# Patient Record
Sex: FEMALE | Race: WHITE | URBAN - METROPOLITAN AREA
[De-identification: names, ages, dates, MRNs, and addresses within clinical notes are randomized per-mention and may not be internally consistent; named-entity substitution may affect disease eponyms.]

---

## 2018-02-28 ENCOUNTER — TRANSFERRED RECORDS (OUTPATIENT)
Dept: HEALTH INFORMATION MANAGEMENT | Facility: CLINIC | Age: 28
End: 2018-02-28

## 2018-04-02 LAB
HBV SURFACE AG SERPL QL IA: NON REACTIVE
HIV 1+2 AB+HIV1 P24 AG SERPL QL IA: NON REACTIVE
RUBELLA ABY IGG: NORMAL

## 2018-05-21 ENCOUNTER — TRANSFERRED RECORDS (OUTPATIENT)
Dept: HEALTH INFORMATION MANAGEMENT | Facility: CLINIC | Age: 28
End: 2018-05-21

## 2018-09-06 LAB — GROUP B STREP PCR: NEGATIVE

## 2018-09-30 ENCOUNTER — HOSPITAL ENCOUNTER (OUTPATIENT)
Facility: CLINIC | Age: 28
Discharge: HOME | End: 2018-09-30
Attending: OBSTETRICS & GYNECOLOGY | Admitting: OBSTETRICS & GYNECOLOGY
Payer: MEDICAID

## 2018-09-30 VITALS
SYSTOLIC BLOOD PRESSURE: 120 MMHG | BODY MASS INDEX: 33.66 KG/M2 | DIASTOLIC BLOOD PRESSURE: 76 MMHG | WEIGHT: 190 LBS | RESPIRATION RATE: 20 BRPM | HEIGHT: 63 IN | TEMPERATURE: 98.1 F

## 2018-09-30 PROBLEM — Z36.89 ENCOUNTER FOR TRIAGE IN PREGNANT PATIENT: Status: ACTIVE | Noted: 2018-09-30

## 2018-09-30 LAB — RUPTURE OF FETAL MEMBRANES BY ROM PLUS: NEGATIVE

## 2018-09-30 PROCEDURE — 84112 EVAL AMNIOTIC FLUID PROTEIN: CPT | Performed by: OBSTETRICS & GYNECOLOGY

## 2018-09-30 PROCEDURE — G0463 HOSPITAL OUTPT CLINIC VISIT: HCPCS

## 2018-09-30 NOTE — IP AVS SNAPSHOT
MRN:[id number]                      After Visit Summary   9/30/2018    Clary Thibodeaux    MRN: [id number]           Thank you!     Thank you for choosing [facility] for your care. Our goal is always to provide you with excellent care. Hearing back from our patients is one way we can continue to improve our services. Please take a few minutes to complete the written survey that you may receive in the mail after you visit. If you would like to speak to someone directly about your visit please contact Patient Relations at [phone]. Thank you!          Patient Information     Date Of Birth          1990        About your hospital stay     You were admitted on:  September 30, 2018 You last received care in the:  [facility] Labor and Delivery    You were discharged on:  September 30, 2018       Who to Call     For medical emergencies, please call 911.  For non-urgent questions about your medical care, please call your primary care provider or clinic, None          Attending Provider     Provider Steffi Livingston,  OB/Gyn       Primary Care Provider    None Specified      Further instructions from your care team       Discharge Instruction for Undelivered Patients      You were seen for: Membrane Assessment  We Consulted: Dr. Steffi Avitia  You had (Test or Medicine):ROM Plus    Diet:   Drink 8 to 12 glasses of liquids (milk, juice, water) every day.  You may eat meals and snacks.     Activity:  Count fetal kicks everyday (see handout)  Call your doctor or nurse midwife if your baby is moving less than usual.     Call your provider if you notice:  Swelling in your face or increased swelling in your hands or legs.  Headaches that are not relieved by Tylenol (acetaminophen).  Changes in your vision (blurring: seeing spots or stars.)  Nausea (sick to your stomach) and vomiting (throwing up).   Weight gain of 5 pounds or more per week.  Heartburn that  "doesn't go away.  Signs of bladder infection: pain when you urinate (use the toilet), need to go more often and more urgently.  The bag of toscano (rupture of membranes) breaks, or you notice leaking in your underwear.  Bright red blood in your underwear.  Abdominal (lower belly) or stomach pain.  For first baby: Contractions (tightening) less than 5 minutes apart for one hour or more.  Second (plus) baby: Contractions (tightening) less than 10 minutes apart and getting stronger.  *If less than 34 weeks: Contractions (tightenings) more than 6 times in one hour.  Increase or change in vaginal discharge (note the color and amount)      Follow-up:  As scheduled in the clinic          Pending Results     No orders found from 2018 to 10/1/2018.            Admission Information     Date & Time Provider Department Dept. Phone    2018 Steffi Avitia,  [facility] Labor and Delivery [phone]      Your Vitals Were     Blood Pressure Temperature Respirations Height Weight BMI (Body Mass Index)    120/76 98.1  F (36.7  C) (Oral) 20 1.6 m (5' 3\") 86.2 kg (190 lb) 33.66 kg/m2      MyChart Information     [employer] lets you send messages to your doctor, view your test results, renew your prescriptions, schedule appointments and more. To sign up, go to [email or web address] . Click on \"Log in\" on the left side of the screen, which will take you to the Welcome page. Then click on \"Sign up Now\" on the right side of the page.     You will be asked to enter the access code listed below, as well as some personal information. Please follow the directions to create your username and password.     Your access code is: [email or web address]  Expires: 2018  2:56 AM     Your access code will  in 90 days. If you need help or a new code, please call your [place] clinic or [phone].        Care EveryWhere ID     This is your Care EveryWhere ID. This could be used by other organizations to access your [place] " medical records  [id number]        Equal Access to Services     ANTONIA BURGOS : Yasmin Venegas, wabrian serrano, qafredrick coats, sami romo. So [facility] [phone].    ATENCIÓN: Si habla español, tiene a leary disposición servicios gratuitos de asistencia lingüística. Llame al [phone].    We comply with applicable federal civil rights laws and [place] laws. We do not discriminate on the basis of race, color, national origin, age, disability, sex, sexual orientation, or gender identity.               Review of your medicines      UNREVIEWED medicines. Ask your doctor about these medicines        Dose / Directions    prenatal multivitamin plus iron 27-0.8 MG Tabs per tablet        Dose:  1 tablet   Take 1 tablet by mouth daily   Refills:  0                Protect others around you: Learn how to safely use, store and throw away your medicines at www.disposemymeds.org.             Medication List: This is a list of all your medications and when to take them. Check marks below indicate your daily home schedule. Keep this list as a reference.      Medications           Morning Afternoon Evening Bedtime As Needed    prenatal multivitamin plus iron 27-0.8 MG Tabs per tablet   Take 1 tablet by mouth daily

## 2018-09-30 NOTE — PLAN OF CARE
Data: [facility]: 2018 12:34 AM.  Reason for maternal/fetal assessment is uterine contractions. Patient reports amor every 6-8 minutes.  Patient is a .  Prenatal record reviewed. Pregnancy  has been complicated by has been uncomplicated.  Gestational Age Unknown. VSS. Fetal movement present. Patient denies vaginal bleeding, abdominal pain, pelvic pressure, nausea, vomiting, headache, visual disturbances, epigastric or URQ pain, significant edema. Support person is present.   Action: Verbal consent for EFM. Triage assessment completed. Bill of rights reviewed.  Response: Patient verbalized agreement with plan. Will contact Dr Steffi Avitia with update and further orders.

## 2018-09-30 NOTE — PLAN OF CARE
Data: Patient assessed in the [facility] for uterine contractions and leaking vaginal fluid.  Cervical exam posterior, 2/60/-2.  Membranes intact.  Contractions every 2-5 minutes.  Action:  Presumed adequate fetal oxygenation documented (see flow record). Discharge instructions reviewed.  Patient instructed to report change in fetal movement, vaginal leaking of fluid or bleeding, abdominal pain, or any concerns related to the pregnancy to her nurse/physician.    Response: Orders to discharge home per Steffi Avitia.  Patient verbalized understanding of education and verbalized agreement with plan. Discharged to home at 0257.

## 2018-09-30 NOTE — PROVIDER NOTIFICATION
09/30/18 0255   Provider Notification   Provider Name/Title Dr. Avitia    Method of Notification Phone   Request Evaluate - Remote   Notification Reason Membrane Status;Uterine Activity;SVE   Updated Dr. Avitia SVE 2/60/-2 with no change after 40 minutes of ambulating unit. Pt felt small leaking after ambulating, ROM + collected and resulted negative. Contractions every 2-5 minutes, pt resting and comfortable. Baby Category 1. Gave orders for discharge.

## 2018-09-30 NOTE — IP AVS SNAPSHOT
[facility] Labor and Delivery    [address]    [facility] [address]    Phone:  [phone]    Fax:  [phone]                                       After Visit Summary   9/30/2018    Clary Thibodeaux    MRN: [id number]           After Visit Summary Signature Page     I have received my discharge instructions, and my questions have been answered. I have discussed any challenges I see with this plan with the nurse or doctor.    ..........................................................................................................................................  Patient/Patient Representative Signature      ..........................................................................................................................................  Patient Representative Print Name and Relationship to Patient    ..................................................               ................................................  Date                                   Time    ..........................................................................................................................................  Reviewed by Signature/Title    ...................................................              ..............................................  Date                                               Time          22EPIC Rev 08/18

## 2018-09-30 NOTE — DISCHARGE INSTRUCTIONS
Discharge Instruction for Undelivered Patients      You were seen for: Membrane Assessment  We Consulted: Dr. Steffi Avitia  You had (Test or Medicine):ROM Plus    Diet:   Drink 8 to 12 glasses of liquids (milk, juice, water) every day.  You may eat meals and snacks.     Activity:  Count fetal kicks everyday (see handout)  Call your doctor or nurse midwife if your baby is moving less than usual.     Call your provider if you notice:  Swelling in your face or increased swelling in your hands or legs.  Headaches that are not relieved by Tylenol (acetaminophen).  Changes in your vision (blurring: seeing spots or stars.)  Nausea (sick to your stomach) and vomiting (throwing up).   Weight gain of 5 pounds or more per week.  Heartburn that doesn't go away.  Signs of bladder infection: pain when you urinate (use the toilet), need to go more often and more urgently.  The bag of toscano (rupture of membranes) breaks, or you notice leaking in your underwear.  Bright red blood in your underwear.  Abdominal (lower belly) or stomach pain.  For first baby: Contractions (tightening) less than 5 minutes apart for one hour or more.  Second (plus) baby: Contractions (tightening) less than 10 minutes apart and getting stronger.  *If less than 34 weeks: Contractions (tightenings) more than 6 times in one hour.  Increase or change in vaginal discharge (note the color and amount)      Follow-up:  As scheduled in the clinic

## 2018-10-01 ENCOUNTER — HOSPITAL ENCOUNTER (INPATIENT)
Facility: CLINIC | Age: 28
LOS: 1 days | Discharge: HOME | End: 2018-10-02
Attending: OBSTETRICS & GYNECOLOGY | Admitting: OBSTETRICS & GYNECOLOGY
Payer: COMMERCIAL

## 2018-10-01 ENCOUNTER — ANESTHESIA (OUTPATIENT)
Dept: OBGYN | Facility: CLINIC | Age: 28
End: 2018-10-01
Payer: COMMERCIAL

## 2018-10-01 ENCOUNTER — ANESTHESIA EVENT (OUTPATIENT)
Dept: OBGYN | Facility: CLINIC | Age: 28
End: 2018-10-01
Payer: COMMERCIAL

## 2018-10-01 LAB
ABO + RH BLD: NORMAL
ABO + RH BLD: NORMAL
HGB BLD-MCNC: 13.3 G/DL (ref 11.7–15.7)
SPECIMEN EXP DATE BLD: NORMAL
T PALLIDUM AB SER QL: NONREACTIVE

## 2018-10-01 PROCEDURE — 25000128 H RX IP 250 OP 636: Performed by: OBSTETRICS & GYNECOLOGY

## 2018-10-01 PROCEDURE — 25000128 H RX IP 250 OP 636: Performed by: ANESTHESIOLOGY

## 2018-10-01 PROCEDURE — 12000029 ZZH R&B OB INTERMEDIATE

## 2018-10-01 PROCEDURE — 00HU33Z INSERTION OF INFUSION DEVICE INTO SPINAL CANAL, PERCUTANEOUS APPROACH: ICD-10-PCS | Performed by: ANESTHESIOLOGY

## 2018-10-01 PROCEDURE — 86901 BLOOD TYPING SEROLOGIC RH(D): CPT | Performed by: OBSTETRICS & GYNECOLOGY

## 2018-10-01 PROCEDURE — 25000132 ZZH RX MED GY IP 250 OP 250 PS 637: Performed by: OBSTETRICS & GYNECOLOGY

## 2018-10-01 PROCEDURE — 25000125 ZZHC RX 250

## 2018-10-01 PROCEDURE — 37000011 ZZH ANESTHESIA WARD SERVICE

## 2018-10-01 PROCEDURE — 40000671 ZZH STATISTIC ANESTHESIA CASE

## 2018-10-01 PROCEDURE — 25000128 H RX IP 250 OP 636

## 2018-10-01 PROCEDURE — 10907ZC DRAINAGE OF AMNIOTIC FLUID, THERAPEUTIC FROM PRODUCTS OF CONCEPTION, VIA NATURAL OR ARTIFICIAL OPENING: ICD-10-PCS | Performed by: OBSTETRICS & GYNECOLOGY

## 2018-10-01 PROCEDURE — 86780 TREPONEMA PALLIDUM: CPT | Performed by: OBSTETRICS & GYNECOLOGY

## 2018-10-01 PROCEDURE — 27110038 ZZH RX 271

## 2018-10-01 PROCEDURE — 72200001 ZZH LABOR CARE VAGINAL DELIVERY SINGLE

## 2018-10-01 PROCEDURE — 85018 HEMOGLOBIN: CPT | Performed by: OBSTETRICS & GYNECOLOGY

## 2018-10-01 PROCEDURE — 3E0R3BZ INTRODUCTION OF ANESTHETIC AGENT INTO SPINAL CANAL, PERCUTANEOUS APPROACH: ICD-10-PCS | Performed by: ANESTHESIOLOGY

## 2018-10-01 PROCEDURE — 86900 BLOOD TYPING SEROLOGIC ABO: CPT | Performed by: OBSTETRICS & GYNECOLOGY

## 2018-10-01 RX ORDER — BUPIVACAINE HCL/0.9 % NACL/PF 0.125 %
PLASTIC BAG, INJECTION (ML) EPIDURAL
Status: COMPLETED
Start: 2018-10-01 | End: 2018-10-01

## 2018-10-01 RX ORDER — AMOXICILLIN 250 MG
2 CAPSULE ORAL 2 TIMES DAILY
Status: DISCONTINUED | OUTPATIENT
Start: 2018-10-01 | End: 2018-10-02 | Stop reason: HOSPADM

## 2018-10-01 RX ORDER — ACETAMINOPHEN 325 MG/1
650 TABLET ORAL EVERY 4 HOURS PRN
Status: DISCONTINUED | OUTPATIENT
Start: 2018-10-01 | End: 2018-10-01

## 2018-10-01 RX ORDER — OXYTOCIN/0.9 % SODIUM CHLORIDE 30/500 ML
100-340 PLASTIC BAG, INJECTION (ML) INTRAVENOUS CONTINUOUS PRN
Status: COMPLETED | OUTPATIENT
Start: 2018-10-01 | End: 2018-10-01

## 2018-10-01 RX ORDER — NALOXONE HYDROCHLORIDE 0.4 MG/ML
.1-.4 INJECTION, SOLUTION INTRAMUSCULAR; INTRAVENOUS; SUBCUTANEOUS
Status: DISCONTINUED | OUTPATIENT
Start: 2018-10-01 | End: 2018-10-01

## 2018-10-01 RX ORDER — ONDANSETRON 2 MG/ML
4 INJECTION INTRAMUSCULAR; INTRAVENOUS EVERY 6 HOURS PRN
Status: DISCONTINUED | OUTPATIENT
Start: 2018-10-01 | End: 2018-10-01

## 2018-10-01 RX ORDER — MISOPROSTOL 200 UG/1
800 TABLET ORAL
Status: DISCONTINUED | OUTPATIENT
Start: 2018-10-01 | End: 2018-10-02 | Stop reason: HOSPADM

## 2018-10-01 RX ORDER — BUPIVACAINE HCL/0.9 % NACL/PF 0.125 %
15 PLASTIC BAG, INJECTION (ML) EPIDURAL CONTINUOUS
Status: DISCONTINUED | OUTPATIENT
Start: 2018-10-01 | End: 2018-10-01

## 2018-10-01 RX ORDER — OXYTOCIN/0.9 % SODIUM CHLORIDE 30/500 ML
100 PLASTIC BAG, INJECTION (ML) INTRAVENOUS CONTINUOUS
Status: DISCONTINUED | OUTPATIENT
Start: 2018-10-01 | End: 2018-10-02 | Stop reason: HOSPADM

## 2018-10-01 RX ORDER — OXYCODONE AND ACETAMINOPHEN 5; 325 MG/1; MG/1
1 TABLET ORAL
Status: DISCONTINUED | OUTPATIENT
Start: 2018-10-01 | End: 2018-10-01

## 2018-10-01 RX ORDER — CARBOPROST TROMETHAMINE 250 UG/ML
250 INJECTION, SOLUTION INTRAMUSCULAR
Status: DISCONTINUED | OUTPATIENT
Start: 2018-10-01 | End: 2018-10-01

## 2018-10-01 RX ORDER — HYDROCORTISONE 2.5 %
CREAM (GRAM) TOPICAL 3 TIMES DAILY PRN
Status: DISCONTINUED | OUTPATIENT
Start: 2018-10-01 | End: 2018-10-02 | Stop reason: HOSPADM

## 2018-10-01 RX ORDER — FENTANYL CITRATE 50 UG/ML
50-100 INJECTION, SOLUTION INTRAMUSCULAR; INTRAVENOUS
Status: DISCONTINUED | OUTPATIENT
Start: 2018-10-01 | End: 2018-10-01

## 2018-10-01 RX ORDER — BISACODYL 10 MG
10 SUPPOSITORY, RECTAL RECTAL DAILY PRN
Status: DISCONTINUED | OUTPATIENT
Start: 2018-10-03 | End: 2018-10-02 | Stop reason: HOSPADM

## 2018-10-01 RX ORDER — OXYTOCIN 10 [USP'U]/ML
10 INJECTION, SOLUTION INTRAMUSCULAR; INTRAVENOUS
Status: DISCONTINUED | OUTPATIENT
Start: 2018-10-01 | End: 2018-10-01

## 2018-10-01 RX ORDER — OXYTOCIN/0.9 % SODIUM CHLORIDE 30/500 ML
340 PLASTIC BAG, INJECTION (ML) INTRAVENOUS CONTINUOUS PRN
Status: DISCONTINUED | OUTPATIENT
Start: 2018-10-01 | End: 2018-10-02 | Stop reason: HOSPADM

## 2018-10-01 RX ORDER — METHYLERGONOVINE MALEATE 0.2 MG/ML
200 INJECTION INTRAVENOUS
Status: DISCONTINUED | OUTPATIENT
Start: 2018-10-01 | End: 2018-10-01

## 2018-10-01 RX ORDER — IBUPROFEN 800 MG/1
800 TABLET, FILM COATED ORAL
Status: COMPLETED | OUTPATIENT
Start: 2018-10-01 | End: 2018-10-01

## 2018-10-01 RX ORDER — SODIUM CHLORIDE, SODIUM LACTATE, POTASSIUM CHLORIDE, CALCIUM CHLORIDE 600; 310; 30; 20 MG/100ML; MG/100ML; MG/100ML; MG/100ML
INJECTION, SOLUTION INTRAVENOUS CONTINUOUS
Status: DISCONTINUED | OUTPATIENT
Start: 2018-10-01 | End: 2018-10-01

## 2018-10-01 RX ORDER — AMOXICILLIN 250 MG
1 CAPSULE ORAL 2 TIMES DAILY
Status: DISCONTINUED | OUTPATIENT
Start: 2018-10-01 | End: 2018-10-02 | Stop reason: HOSPADM

## 2018-10-01 RX ORDER — LANOLIN 100 %
OINTMENT (GRAM) TOPICAL
Status: DISCONTINUED | OUTPATIENT
Start: 2018-10-01 | End: 2018-10-02 | Stop reason: HOSPADM

## 2018-10-01 RX ORDER — OXYTOCIN/0.9 % SODIUM CHLORIDE 30/500 ML
PLASTIC BAG, INJECTION (ML) INTRAVENOUS
Status: COMPLETED
Start: 2018-10-01 | End: 2018-10-01

## 2018-10-01 RX ORDER — EPHEDRINE SULFATE 50 MG/ML
5 INJECTION, SOLUTION INTRAMUSCULAR; INTRAVENOUS; SUBCUTANEOUS
Status: DISCONTINUED | OUTPATIENT
Start: 2018-10-01 | End: 2018-10-01

## 2018-10-01 RX ORDER — BUPIVACAINE HYDROCHLORIDE 5 MG/ML
INJECTION, SOLUTION EPIDURAL; INTRACAUDAL PRN
Status: DISCONTINUED | OUTPATIENT
Start: 2018-10-01 | End: 2018-10-01

## 2018-10-01 RX ORDER — LIDOCAINE HYDROCHLORIDE 10 MG/ML
INJECTION, SOLUTION INFILTRATION; PERINEURAL
Status: DISCONTINUED
Start: 2018-10-01 | End: 2018-10-01 | Stop reason: HOSPADM

## 2018-10-01 RX ORDER — IBUPROFEN 600 MG/1
600 TABLET, FILM COATED ORAL EVERY 6 HOURS PRN
Status: DISCONTINUED | OUTPATIENT
Start: 2018-10-01 | End: 2018-10-02 | Stop reason: HOSPADM

## 2018-10-01 RX ADMIN — SODIUM CHLORIDE, POTASSIUM CHLORIDE, SODIUM LACTATE AND CALCIUM CHLORIDE: 600; 310; 30; 20 INJECTION, SOLUTION INTRAVENOUS at 06:23

## 2018-10-01 RX ADMIN — SENNOSIDES AND DOCUSATE SODIUM 1 TABLET: 8.6; 5 TABLET ORAL at 22:06

## 2018-10-01 RX ADMIN — Medication 15 ML/HR: at 05:50

## 2018-10-01 RX ADMIN — OXYTOCIN-SODIUM CHLORIDE 0.9% IV SOLN 30 UNIT/500ML 340 ML/HR: 30-0.9/5 SOLUTION at 08:41

## 2018-10-01 RX ADMIN — IBUPROFEN 800 MG: 800 TABLET ORAL at 09:19

## 2018-10-01 RX ADMIN — Medication 340 ML/HR: at 08:41

## 2018-10-01 RX ADMIN — IBUPROFEN 600 MG: 600 TABLET ORAL at 15:30

## 2018-10-01 RX ADMIN — BUPIVACAINE HYDROCHLORIDE 6 ML: 5 INJECTION, SOLUTION EPIDURAL; INTRACAUDAL; PERINEURAL at 05:50

## 2018-10-01 RX ADMIN — IBUPROFEN 600 MG: 600 TABLET ORAL at 22:06

## 2018-10-01 RX ADMIN — SODIUM CHLORIDE, POTASSIUM CHLORIDE, SODIUM LACTATE AND CALCIUM CHLORIDE 1000 ML: 600; 310; 30; 20 INJECTION, SOLUTION INTRAVENOUS at 05:15

## 2018-10-01 NOTE — L&D DELIVERY NOTE
Delivery Summary with Anglin  DELIVERY SUMMARY    Clary Thibodeaux MRN# [id number]   Age: 28 year old YOB: 1990     ASSESSMENT & PLAN: routine PP care    Anglin Assessment Tool Data    Gestational Age:  Gestational Age: 39w3d     Maternal temperature range:  Temp  Av.5  F (36.9  C)  Min: 98.1  F (36.7  C)  Max: 98.9  F (37.2  C)    Membranes ruptured for:   0h 50m     GBS status:  Lab Results   Component Value Date    GBS Negative 2018       Antibiotic Status:  Antibiotics         IV Antibiotic Given         Additional Management      Fetal Status Prior to  Delivery Category 2    Fetal Status Comments overall reassuring, occasional variable decelerations with contractions likely due to cord and fetal head compression during contractions        Sepsis Prebirth Score:       Sepsis Postbirth Score:       Determination based on clinical exam after birth:       Disposition:            Labor Event Times    Labor onset date:  10/1/18 Onset time:   1:30 AM   Dilation complete date:  10/1/18 Complete time:   7:28 AM            Labor Length    1st Stage (hrs):  5 (min):  58   2nd Stage (hrs):  1 (min):  9   3rd Stage (hrs):  0 (min):  5      Labor Events     labor?:  No    steroids:  None   Labor Type:  Spontaneous   Predominate monitoring during 1st stage:  continuous electronic fetal monitoring      Antibiotics received during labor?:  No      Rupture identifier:  Rupture 1   Rupture date/time: 10/1/18 0747   Rupture type:  Artificial Rupture of Membranes   Fluid color:  Clear   Fluid odor:  Normal      Delivery/Placenta Date and Time    Delivery Date:  10/1/18 Delivery Time:   8:37 AM   Placenta Date/Time:  10/1/2018  8:42 AM   Oxytocin given at the time of delivery:  after delivery of baby      Vaginal Counts    Initial count performed by 2 team members:   Two Team Members   AIDEN Yeager Dr Suture Downing Sponges Instruments   Initial  "counts 2  5    Added to count       Final counts          Placed during labor Accounted for at the end of labor               Apgars    Living status:  Living    1 Minute 5 Minute 10 Minute 15 Minute 20 Minute   Skin color: 0  0       Heart rate: 2  2       Reflex irritability: 2  2       Muscle tone: 2  2       Respiratory effort: 2  2       Total: 8  8          Apgars assigned by:  GONZALES VICTORIA RN      Cord    Vessels:  3 Vessels Complications:  Nuchal   Cord Blood Disposition:  Lab          [place] Resuscitation       Output in Delivery Room:  Voided      [place] Measurements    Weight:  7 lb 6.9 oz Length:  1' 7.5\"   Head circumference:  34.3 cm       Skin to Skin and Feeding Plan    Skin to skin initiation date/time: 10/1/18 0843   Skin to skin with:  Mother   Skin to skin end date/time:        Labor Events and Shoulder Dystocia    Fetal Tracing Prior to Delivery:  Category 2   Fetal Tracing Comments:  overall reassuring, occasional variable decelerations with contractions likely due to cord and fetal head compression during contractions    Shoulder dystocia present?:  Neg            Delivery (Maternal) (Provider to Complete) (876183)    Episiotomy:  None   Perineal lacerations:  None    Vaginal laceration?:  No    Cervical laceration?:  No    Est. blood loss (mL):  359   Number of repair packets:  0         Mother's Information  Mother: Clary Thibodeaux #[id number]    Start of Mother's Information     IO Blood Loss  10/01/18 0130 - 10/01/18 1034    Mom's I/O Activity            End of Mother's Information  Mother: Clary Thibodeaux #[id number]            Delivery - Provider to Complete (582998)    Delivering clinician:  MORAIMA MARES   Attempted Delivery Types (Choose all that apply):  Spontaneous Vaginal Delivery   Delivery Type (Choose the 1 that will go to the Birth History):  Vaginal, Spontaneous Delivery                     Other personnel:   Provider Role   TARYN CHENG Delivery Assist          "   Placenta    Immediate Cord Clamping:  Done   Date/Time:  10/1/2018  8:42 AM   Removal:  Spontaneous   Comments:  tight nucal cord times 1, need to be clamped and cut at perineum to proceed with delivery   Disposition:  Hospital disposal      Anesthesia    Method:  Epidural   Cervical dilation at placement:  4-7   Analgesic:   BIRTH HISTORY: ANALGESIC   BUPIVACAINE 0.125%  ML NS EPIDURAL DRIP            Presentation and Position    Presentation:  Vertex   Position:  Middle Occiput Anterior                  28 year old  at 39w3d admitted with active labor. , liveborn male infant, OA position, clear fluid. Cord was clamped and cut at perineum due to nuchal cord X 1 that was tight. Baby info as above. Nose and oropharynx bulb suctioned on perineum. Both shoulders delivered without complication. Placenta expressed intact with 3VC.   No cervical, perineal, or periurethral lacerations. QBL = 359. Counts correct. Dispo stable in LDR.     Juhi Fenton MD

## 2018-10-01 NOTE — PROVIDER NOTIFICATION
10/01/18 0526   Provider Notification   Provider Name/Title Dr Coley   Method of Notification Phone   Request Evaluate in Person   Notification Reason Patient Arrived;Labor Status;Pain;SVE   Comments   Comments on her way to room to evaluate

## 2018-10-01 NOTE — PROVIDER NOTIFICATION
10/01/18 0740   Provider Notification   Provider Name/Title Dr Fenton   Method of Notification At Bedside   Notification Reason SVE   SVE and amniotomy performed. MD will stay on unit.

## 2018-10-01 NOTE — PLAN OF CARE
Problem: Patient Care Overview  Goal: Plan of Care/Patient Progress Review  Outcome: Therapy, progress toward functional goals as expected  Pt independent with self cares, no pain during shift - ibuprofen taken prophylactically, pt bonding with , breastfeeding going well, support bedside.

## 2018-10-01 NOTE — PROGRESS NOTES
"[facility]   PROGRESS NOTE     S. Pt states she is doing well overall, comfortable with epidural in place . +FM    BP 91/54  Temp 98.4  F (36.9  C) (Oral)  Resp 16  Ht 1.6 m (5' 2.99\")  Wt 87.1 kg (192 lb)  BMI 34.02 kg/m2    [place] ctxs q 2-3 min   bpm, mod, a+, occasional late decel (one time 0710 hrs), two variable decels after AROM was done, with spontaneous recovery, overall reassuring   SVE unchanged from last exam, 10/100/-2 AROM done, clear fluid, pt tolerated well    A/P Clary Thibodeaux is a 28 year old  at 39w3d here with     1- active labor  - FHT Cat II, overall reassuring  - AROM done, clear fluid   - anticipate   - continue monitoring    Dr. Debbie Fenton  [phone]       "

## 2018-10-01 NOTE — PLAN OF CARE
Data: Patient presented to [facility]: 10/1/2018  4:38 AM.  Reason for maternal/fetal assessment is uterine contractions. Patient reports painful contractions since 0130 this AM, states they are increasing in intensity and frequency.  Patient is a .  Prenatal record reviewed. Pregnancy has been uncomplicated..  Gestational Age 39w3d. VSS. Fetal movement present. Patient denies leaking of vaginal fluid/rupture of membranes, vaginal bleeding. Support person Jason is present.   Action: Verbal consent for EFM. Triage assessment completed. Bill of rights reviewed.  Response: Patient verbalized agreement with plan. Will contact Dr Naomi Tabares with update and further orders.

## 2018-10-01 NOTE — PLAN OF CARE
Data: Clary Thibodeaux transferred to [facility] via wheelchair at 1110. Baby transferred via parent's arms.  Action: Receiving unit notified of transfer: Yes. Patient and family notified of room change. Report given to AIDEN Fan at 1125. Belongings sent to receiving unit. Accompanied by Registered Nurse. Oriented patient to surroundings. Call light within reach. ID bands double-checked with receiving RN.  Response: Patient tolerated transfer and is stable.

## 2018-10-01 NOTE — PLAN OF CARE
Problem: Patient Care Overview  Goal: Plan of Care/Patient Progress Review  Outcome: No Change  Report given at 11.30 am following  transfer to room 451 . Saadia martinez needed to get patient to bathroom prior to transfer and did void. Advised to call for nursing when wants to use bathroom as fall risk and call light within reach. Significant other present on transfer. Orientated  to room. Denies pain. Patient would like to rest.    14.30. Patient walked to bathroom without difficulty, voided , declined analgesia.

## 2018-10-01 NOTE — ANESTHESIA PROCEDURE NOTES
Peripheral nerve/Neuraxial procedure note : epidural catheter  Pre-Procedure  Performed by ANGEL CASTILLO  Referred by ASIYA  Location: OB    Procedure Times:10/1/2018 5:41 AM and 10/1/2018 5:51 AM  Pre-Anesthestic Checklist: patient identified, IV checked, risks and benefits discussed, informed consent, monitors and equipment checked, pre-op evaluation and at physician/surgeon's request    Timeout  Correct Patient: Yes   Correct Procedure: Yes   Correct Site: Yes   Correct Laterality: N/A   Correct Position: Yes   Site Marked: N/A   .   Procedure Documentation    .    Procedure:    Epidural catheter.  Insertion Site:L3-4  (midline approach) Injection technique: LORT saline   Local skin infiltrated with mL of 1% lidocaine.  TANIA at 5 cm     Patient Prep;mask, sterile gloves, povidone-iodine 7.5% surgical scrub, patient draped.  .  Needle: Touhy needle Needle Gauge: 17.    Needle Length (Inches) 3.5  # of attempts: 1 and  # of redirects:  2 .   . .  Catheter threaded easily  6 cm epidural space.  11 cm at skin.   .    Assessment/Narrative  Paresthesias: No.  .  .  Aspiration negative for heme or CSF  . Test dose of 3 mL lidocaine 1.5% w/ 1:200,000 epinephrine at 05:47.  Test dose negative for signs of intravascular, subdural or intrathecal injection.

## 2018-10-01 NOTE — PROVIDER NOTIFICATION
10/01/18 0545   Provider Notification   Provider Name/Title Dr. Sparks   Method of Notification In Department   Request Evaluate in Person   Notification Reason Pain   Comments   Comments epidural   Epidural placed by Dr. Sparks, patient tolerated procedure well.

## 2018-10-01 NOTE — IP AVS SNAPSHOT
[facility]    [address]    [facility] [address]    Phone:  [phone]    Fax:  [phone]                                       After Visit Summary   10/1/2018    Clary Thibodeaux    MRN: [id number]           After Visit Summary Signature Page     I have received my discharge instructions, and my questions have been answered. I have discussed any challenges I see with this plan with the nurse or doctor.    ..........................................................................................................................................  Patient/Patient Representative Signature      ..........................................................................................................................................  Patient Representative Print Name and Relationship to Patient    ..................................................               ................................................  Date                                   Time    ..........................................................................................................................................  Reviewed by Signature/Title    ...................................................              ..............................................  Date                                               Time          22EPIC Rev 08/18

## 2018-10-01 NOTE — IP AVS SNAPSHOT
MRN:[id number]                      After Visit Summary   10/1/2018    Clary Thibodeaux    MRN: [id number]           Thank you!     Thank you for choosing [facility] for your care. Our goal is always to provide you with excellent care. Hearing back from our patients is one way we can continue to improve our services. Please take a few minutes to complete the written survey that you may receive in the mail after you visit. If you would like to speak to someone directly about your visit please contact Patient Relations at [phone]. Thank you!          Patient Information     Date Of Birth          1990        About your hospital stay     You were admitted on:  October 1, 2018 You last received care in the:  [facility]    You were discharged on:  October 2, 2018        Reason for your hospital stay       Maternity care                  Who to Call     For medical emergencies, please call 911.  For non-urgent questions about your medical care, please call your primary care provider or clinic, None          Attending Provider     Provider Specialty    Naomi Tabares MD OB/Gyn    Juhi Fenton MD OB/Gyn       Primary Care Provider Fax #    Physician No Ref-Primary [phone]      After Care Instructions     Activity       Review discharge instructions            Diet       Resume previous diet            Discharge Instructions - Postpartum visit       Schedule postpartum visit with your OB provider and return to clinic in 6 weeks.                  Further instructions from your care team       Postpartum Vaginal Delivery Instructions    Activity       Ask family and friends for help when you need it.    Do not place anything in your vagina for 6 weeks.    You are not restricted on other activities, but take it easy for a few weeks to allow your body to recover from delivery.  You are able to do any activities you feel up to that point.    No driving  until you have stopped taking your pain medications (usually two weeks after delivery).     Call your health care provider if you have any of these symptoms:       Increased pain, swelling, redness, or fluid around your stiches from an episiotomy or perineal tear.    A fever above 100.4 F (38 C) with or without chills when placing a thermometer under your tongue.    You soak a sanitary pad with blood within 1 hour, or you see blood clots larger than a golf ball.    Bleeding that lasts more than 6 weeks.    Vaginal discharge that smells bad.    Severe pain, cramping or tenderness in your lower belly area.    A need to urinate more frequently (use the toilet more often), more urgently (use the toilet very quickly), or it burns when you urinate.    Nausea and vomiting.    Redness, swelling or pain around a vein in your leg.    Problems breastfeeding or a red or painful area on your breast.    Chest pain and cough or are gasping for air.    Problems coping with sadness, anxiety, or depression.  If you have any concerns about hurting yourself or the baby, call your provider immediately.     You have questions or concerns after you return home.     Keep your hands clean:  Always wash your hands before touching your perineal area and stitches.  This helps reduce your risk of infection.  If your hands aren't dirty, you may use an alcohol hand-rub to clean your hands. Keep your nails clean and short.        Pending Results     No orders found for last 3 day(s).            Statement of Approval     Ordered          10/02/18 0727  I have reviewed and agree with all the recommendations and orders detailed in this document.  EFFECTIVE NOW     Approved and electronically signed by:  Reji López MD             Admission Information     Date & Time Provider Department Dept. Phone    10/1/2018 Juhi Fenton MD [facility] Postpartum [phone]      Your Vitals Were     Blood Pressure Pulse Temperature Respirations  "Height Weight    112/70 75 97.8  F (36.6  C) (Oral) 17 1.6 m (5' 2.99\") 87.1 kg (192 lb)    BMI (Body Mass Index)                   34.02 kg/m2           [employer] Information     [employer] lets you send messages to your doctor, view your test results, renew your prescriptions, schedule appointments and more. To sign up, go to [email or web address] . Click on \"Log in\" on the left side of the screen, which will take you to the Welcome page. Then click on \"Sign up Now\" on the right side of the page.     You will be asked to enter the access code listed below, as well as some personal information. Please follow the directions to create your username and password.     Your access code is: [email or web address]  Expires: 2018  2:56 AM     Your access code will  in 90 days. If you need help or a new code, please call your [place] clinic or [phone].        Care EveryWhere ID     This is your Care EveryWhere ID. This could be used by other organizations to access your [place] medical records  [id number]        Equal Access to Services     ANTONIA BURGOS AH: Hadvenkat Venegas, wabrian serrano, qafredrick kaalmada pauly, sami romo. So [facility] [phone].    ATENCIÓN: Si habla español, tiene a leary disposición servicios gratuitos de asistencia lingüística. Clotilde al [phone].    We comply with applicable federal civil rights laws and [place] laws. We do not discriminate on the basis of race, color, national origin, age, disability, sex, sexual orientation, or gender identity.               Review of your medicines      CONTINUE these medicines which have NOT CHANGED        Dose / Directions    prenatal multivitamin plus iron 27-0.8 MG Tabs per tablet        Dose:  1 tablet   Take 1 tablet by mouth daily   Refills:  0                Protect others around you: Learn how to safely use, store and throw away your medicines at www.disposemymeds.org.             Medication List: " This is a list of all your medications and when to take them. Check marks below indicate your daily home schedule. Keep this list as a reference.      Medications           Morning Afternoon Evening Bedtime As Needed    prenatal multivitamin plus iron 27-0.8 MG Tabs per tablet   Take 1 tablet by mouth daily

## 2018-10-02 VITALS
BODY MASS INDEX: 34.02 KG/M2 | RESPIRATION RATE: 17 BRPM | HEIGHT: 63 IN | TEMPERATURE: 97.8 F | DIASTOLIC BLOOD PRESSURE: 70 MMHG | WEIGHT: 192 LBS | HEART RATE: 75 BPM | SYSTOLIC BLOOD PRESSURE: 112 MMHG

## 2018-10-02 PROBLEM — Z36.89 ENCOUNTER FOR TRIAGE IN PREGNANT PATIENT: Status: RESOLVED | Noted: 2018-09-30 | Resolved: 2018-10-02

## 2018-10-02 PROCEDURE — 40000083 ZZH STATISTIC IP LACTATION SERVICES 1-15 MIN

## 2018-10-02 PROCEDURE — 25000132 ZZH RX MED GY IP 250 OP 250 PS 637: Performed by: OBSTETRICS & GYNECOLOGY

## 2018-10-02 RX ADMIN — SENNOSIDES AND DOCUSATE SODIUM 1 TABLET: 8.6; 5 TABLET ORAL at 09:05

## 2018-10-02 RX ADMIN — IBUPROFEN 600 MG: 600 TABLET ORAL at 13:33

## 2018-10-02 RX ADMIN — IBUPROFEN 600 MG: 600 TABLET ORAL at 06:13

## 2018-10-02 NOTE — LACTATION NOTE
ZAYDA to see patient.  Her baby has been nursing well per her report.  She also successfully nursed her last baby.  ZAYDA reviewed symptoms of mastitis and when to call her doctor.  She has no questions and is aware she may contact lactation following discharge if needed.

## 2018-10-02 NOTE — DISCHARGE SUMMARY
"[facility] Postpartum/Discharge Note    S:  Patient without complaints.  Minimal lochia.  Wants to go home if baby okay for discharge.    O:  Blood pressure 111/64, pulse 82, temperature 98.9  F (37.2  C), temperature source Oral, resp. rate 16, height 1.6 m (5' 2.99\"), weight 87.1 kg (192 lb), unknown if currently breastfeeding.        Urine output adequate        Abdomen - Fundus firm, at umbilicus, nontender        Extremities - No calf tenderness    A:   Postpartum Day# 1, s/p Vaginal delivery - doing well    P:  1)  Discharge home if baby okay for discharge.  If baby needs to stay, cancel discharge.        2)  F/U 6 weeks w/ Primary OB        3)  Discharge meds: OTC ibuprofen    Reji López MD          "

## 2018-10-02 NOTE — PLAN OF CARE
Problem: Patient Care Overview  Goal: Plan of Care/Patient Progress Review  Outcome: Adequate for Discharge Date Met: 10/02/18  Pt meets expected goals for discharge, vss, states her pain is controlled well with ibuprofen, breast feeding every 2-3 hrs and seen by lactation today; discharge education is complete, informed about follow up with OB dr, answered questions; [facility] since early discharge, phone is provided, no further questions, discharging to home with baby in stable condition.

## 2018-10-02 NOTE — PLAN OF CARE
"Problem: Patient Care Overview  Goal: Plan of Care/Patient Progress Review  Outcome: Adequate for Discharge Date Met: 10/02/18  Called [facility] regarding seeing the baby tomorrow for bili check and they stated they are \"full\" and will not be able to recheck tomorrow; tried to reach the pt/familly by calling 2 different phone numbers (taken from pt's face sheet) and left 2 messages for pt and family per each phone regarding further plan: to recheck TSB at dr's clinic instead.      "

## 2018-10-02 NOTE — ANESTHESIA POSTPROCEDURE EVALUATION
Patient: Clary Thibodeaux    * No procedures listed *    Diagnosis:* No pre-op diagnosis entered *  Diagnosis Additional Information: Labor pain    Anesthesia Type:  Epidural    Note:  Anesthesia Post Evaluation    Patient location during evaluation: PACU  Patient participation: Able to fully participate in evaluation  Level of consciousness: awake  Pain management: adequate  Airway patency: patent  Cardiovascular status: acceptable  Respiratory status: acceptable  Hydration status: acceptable  PONV: controlled     Anesthetic complications: None    Comments: .Labor Epidural Post delivery note.      Doing well. VSS Temp normal. Satisfactory respiratory and cardiovascular function. Return of neurologic function. Questions encouraged and answered. Denies positional headache. Minimal side effects easily managed w/ PRN meds. No apparent anesthetic complications. No follow-up required.    I or my partner was immediately available for epidural management.    REANNA Linda            Last vitals:  Vitals:    10/01/18 2100 10/02/18 0245 10/02/18 0811   BP: 124/78 111/64 112/70   Pulse: 96 82 75   Resp: 18 16 17   Temp: 99.4  F (37.4  C) 98.9  F (37.2  C) 97.8  F (36.6  C)         Electronically Signed By: Lenny Linda DO  October 2, 2018  10:08 AM

## 2018-10-02 NOTE — PLAN OF CARE
Problem: Patient Care Overview  Goal: Plan of Care/Patient Progress Review  Outcome: Improving  Pt up ad sree.  VSS.  Pain well controlled with ibuprofen.  Voiding frequently and without difficulty.  Breastfeeding, good latch observed.  FOB present overnight.  Patient desires to discharge today.

## 2018-10-02 NOTE — PLAN OF CARE
Problem: Patient Care Overview  Goal: Plan of Care/Patient Progress Review  Outcome: Improving  Pt is stable. VSS. Ambulating. Pain controlled with Ibuprofen. Pt breastfeeding baby q 2-3 hours. Bonding with baby . Continue to monitor and assist per pt care plan and need.

## 2020-02-02 NOTE — H&P
[facility] Labor and Delivery History and Physical    Clary Thibodeaux MRN# [id number]   Age: 28 year old YOB: 1990     Date of Admission:  10/1/2018    Primary care provider: MD Amie           HPI:   Clary Thibodeaux is a 28 year old  at 39w3d by LMP c/w 8 week US admitted in active labor.  She reports onset of contractions around 0130 which have increased in frequency and intensity since.  She denies any vaginal bleeding, leakage of fluid or changes in her vaginal discharge.  Good fetal movement.  States she she otherwise feels well and at her baseline; denies and CP, SOB, N/V, dysuria, hematuria, diarrhea. Patient currently denies any headache, vision changes, shortness of breath, chest pain or RUQ/epigastric abdominal pain.      Pregnancy c/b:   H/o GDM  H/o shoulder dystocia; patient cannot recall details  Obesity; BMI 33          Pregnancy history:     OBSTETRIC HISTORY:  Obstetric History       T2      L1     SAB1   TAB0   Ectopic0   Multiple0   Live Births1       # Outcome Date GA Lbr Loc/2nd Weight Sex Delivery Anes PTL Lv   4 Current            3 Term 16 39w3d  3.118 kg (6 lb 14 oz) F Vag-Spont EPI,Nitrous  MARV      Name: PANCHITO AHUJA      Apgar1:  8                Apgar5: 9   2 Term            1 SAB                   EDC: Estimated Date of Delivery: Oct 5, 2018    Prenatal Labs:   Lab Results   Component Value Date    ABO AB 10/01/2018    RH Pos 10/01/2018    AS Neg 2016    TREPAB Negative 2016    RUBELLAABIGG Immune 2018    HGB 13.3 10/01/2018       GBS Status:   Negative on 18    Ultrasounds:  Level II Normal anatomy; placenta anterior,         Maternal Past Medical History:     PMH: Denies  PSH: Denies     Prescriptions Prior to Admission   Medication Sig Dispense Refill Last Dose     Prenatal Vit-Fe Fumarate-FA (PRENATAL MULTIVITAMIN  PLUS IRON) 27-0.8 MG TABS Take 1 tablet by mouth daily   2018 at Unknown time            Family History:   Denies any history of bleeding or clotting disorders, no adverse reactions to anesthesia.            Social History:   Lives with boyfriend Jason.  Does not work outside of the home.  Denies tobacco or illicit drug use. No ETOH.          Review of Systems:   The Review of Systems is negative other than noted in the HPI          Physical Exam:   No data found.  BP 87-90/50-52 after epidural, Pulse is 96    Gen: Pleasant, NAD   CV:  Regular rate and rhythm, no murmurs, rubs or gallops appreciated   Resp: Non-labored breathing.  Lungs clear to ausculation bilaterally   Abd: Obese, soft, non-tender and non-distended   Ext: Trace pedal edema bilaterally     Cervix: 7 cm per RN  Membranes: Inact   EFW: 7 lbs.  Presentation:Cephalic    Fetal Heart Rate Tracing:   Baseline 150  Variability: Moderate  Accelerations: Present  Decelerations: None  Interpretation: reactive    Contractions: q 3 minutes        Assessment:   Clary Thibodeaux is a 28 year old  at 39w3d admitted in active labor.        Plan:     Labor:   - Just received epidural for analgesia with good relief  -  Progressing spontaneously; reassess in 1-2 hours. Discussed R/B to AROM; patient amenable after recheck if unchanged.     FWB:   - Continous EFM   - Category I FHT; reactive    Other:   - Rh positive, RI, placenta anterior, EFW 7#   - GBS negative   - H/o shoulder dystocia; plan on 2nd nurse in room for delivery   - Planning on breastfeeding, open to bottle supplementation  - Contraception: Got pregnancy while on OCPs (forgetting), opening to Nexplanon.  Does not want IUD.     Naomi Moon MD   2018, 6:06 AM      0 = independent